# Patient Record
Sex: FEMALE | Race: BLACK OR AFRICAN AMERICAN | NOT HISPANIC OR LATINO | Employment: STUDENT | URBAN - METROPOLITAN AREA
[De-identification: names, ages, dates, MRNs, and addresses within clinical notes are randomized per-mention and may not be internally consistent; named-entity substitution may affect disease eponyms.]

---

## 2020-03-27 ENCOUNTER — PES CALL (OUTPATIENT)
Dept: ADMINISTRATIVE | Facility: CLINIC | Age: 40
End: 2020-03-27

## 2020-03-27 ENCOUNTER — TELEPHONE (OUTPATIENT)
Dept: INTERNAL MEDICINE | Facility: CLINIC | Age: 40
End: 2020-03-27

## 2020-03-27 ENCOUNTER — OFFICE VISIT (OUTPATIENT)
Dept: INTERNAL MEDICINE | Facility: CLINIC | Age: 40
End: 2020-03-27
Payer: MEDICAID

## 2020-03-27 ENCOUNTER — NURSE TRIAGE (OUTPATIENT)
Dept: ADMINISTRATIVE | Facility: CLINIC | Age: 40
End: 2020-03-27

## 2020-03-27 DIAGNOSIS — J20.8 VIRAL BRONCHITIS: Primary | ICD-10-CM

## 2020-03-27 PROCEDURE — 99213 OFFICE O/P EST LOW 20 MIN: CPT | Mod: 95,,, | Performed by: INTERNAL MEDICINE

## 2020-03-27 PROCEDURE — 99213 PR OFFICE/OUTPT VISIT, EST, LEVL III, 20-29 MIN: ICD-10-PCS | Mod: 95,,, | Performed by: INTERNAL MEDICINE

## 2020-03-27 NOTE — PROGRESS NOTES
Subjective:       Patient ID: Stanley Rodas is a 39 y.o. female.    Chief Complaint: No chief complaint on file.    HPI   The patient location is: [place], [place]  The chief complaint leading to consultation is: cough  Visit type: Virtual visit with synchronous audio and video  Total time spent with patient: 10 minutes  Each patient to whom he or she provides medical services by telemedicine is:  (1) informed of the relationship between the physician and patient and the respective role of any other health care provider with respect to management of the patient; and (2) notified that he or she may decline to receive medical services by telemedicine and may withdraw from such care at any time.    Pt who was just seen in UC today for cough is here for video visit. Pt developed a dry cough on Monday which seems to be getting worse. She then developed a few episodes of diarrhea on Wed which has resolved by now and yesterday she developed SOB without any wheezing. Temp was up to 99. In UC today she had a normal CXR and Influenza screen was neg. Pt was given Abx, Albuterol inhaler and tessalon perles which she will start.     Review of Systems   Constitutional: Negative for activity change, appetite change, chills, diaphoresis, fatigue, fever and unexpected weight change.   HENT: Negative for postnasal drip, rhinorrhea, sinus pressure, sneezing, sore throat, trouble swallowing and voice change.    Respiratory: Positive for cough and shortness of breath. Negative for wheezing.    Cardiovascular: Negative for chest pain, palpitations and leg swelling.   Gastrointestinal: Positive for diarrhea. Negative for abdominal pain, blood in stool, constipation, nausea and vomiting.   Genitourinary: Negative for dysuria.   Musculoskeletal: Negative for arthralgias and myalgias.   Skin: Negative for rash and wound.   Allergic/Immunologic: Negative for environmental allergies and food allergies.   Hematological: Negative for  adenopathy. Does not bruise/bleed easily.       Objective:      Physical Exam   Constitutional: She is oriented to person, place, and time. She appears well-developed and well-nourished. No distress.   Neurological: She is alert and oriented to person, place, and time.   Skin: She is not diaphoretic.       Assessment:       1. Viral bronchitis        Plan:    1. Pt advised to take what she was given in UC today       Pt advised to go to the ED if her cough gets worse a/w worsening SOB or high fevers. She voiced understanding. Work note given for 1 wk off.

## 2020-03-27 NOTE — TELEPHONE ENCOUNTER
Virtual visit with pt today, she will need a work note giving her another week off from work b/c of her cough. She will need to be asymptomatic x 3 days until she may return to work.

## 2020-03-27 NOTE — LETTER
March 27, 2020    Re:  Stanley Rodas  [address] [address]         [facility]  [address]  [place] [id number]  Phone: [phone]  Fax: [phone] To Whom It May Concern;    Patient was seen today for her current symptoms and needs  At least another week off from work because of her cough.   She will need to be asymptomatic x 3 days before l she may   return to work.    If you have any questions or concerns, please don't hesitate to call.    Sincerely,    Aiden Babb, DO   naun/lb

## 2020-03-27 NOTE — TELEPHONE ENCOUNTER
Reason for Disposition   [1] Fever > 100.0 F (37.8 C) AND [2] diabetes mellitus or weak immune system (e.g., HIV positive, cancer chemo, splenectomy, chronic steroids)    Additional Information   Negative: Severe difficulty breathing (e.g., struggling for each breath, speaks in single words)   Negative: Bluish (or gray) lips or face now   Negative: [1] Difficulty breathing AND [2] exposure to flames, smoke, or fumes   Negative: [1] Stridor AND [2] difficulty breathing   Negative: Sounds like a life-threatening emergency to the triager   Negative: [1] Previous asthma attacks AND [2] this feels like asthma attack   Negative: Dry (non-productive) cough (i.e., no sputum or minimal clear sputum)   Negative: Chest pain  (Exception: MILD central chest pain, present only when coughing)   Negative: Difficulty breathing   Negative: Patient sounds very sick or weak to the triager   Negative: [1] Coughed up blood AND [2] > 1 tablespoon (15 ml) (Exception: blood-tinged sputum)   Negative: [1] Fever > 100.0 F (37.8 C) AND [2] bedridden (e.g., nursing home patient, CVA, chronic illness, recovering from surgery)   Negative: [1] Fever > 101 F (38.3 C) AND [2] age > 60   Negative: Fever > 103 F (39.4 C)    Protocols used: COUGH - ACUTE [id number]  pt states seen today in . Told to call us to get tested.  had shortage on covid 19 tests. pt coughing up clear mucous , flu neg. having diarrhea x 3 days(all day long). Nausea. Last pm hard to breathe.' Chest tight'. Hx bronchitis, enlarged heart, HTN. Rectum sore due to diarrhea. Taking Gatorade and water, afeb. body aches. Coughing since Monday. used albuterol didnt seem to help. Works at [employer]. Feels hot to cold. T99 max. Treated for bronchitis at , unclear CXR results - pt doesnt feel she needs abx. Pt transferred for virtual visit today

## 2020-03-30 ENCOUNTER — PATIENT MESSAGE (OUTPATIENT)
Dept: INTERNAL MEDICINE | Facility: CLINIC | Age: 40
End: 2020-03-30

## 2020-03-31 NOTE — TELEPHONE ENCOUNTER
Spoke to pt, she is having watery diarrhea which has been getting worse since starting Abx. No fevers/chills. She was advised on the need to see a provider by her house to r/o C. Diff infection. Until then push Pedialyte. ED precautions discussed with pt.

## 2020-04-02 ENCOUNTER — PATIENT MESSAGE (OUTPATIENT)
Dept: INTERNAL MEDICINE | Facility: CLINIC | Age: 40
End: 2020-04-02

## 2020-04-03 ENCOUNTER — PATIENT MESSAGE (OUTPATIENT)
Dept: INTERNAL MEDICINE | Facility: CLINIC | Age: 40
End: 2020-04-03

## 2020-06-03 PROBLEM — G47.33 OSA (OBSTRUCTIVE SLEEP APNEA): Status: ACTIVE | Noted: 2020-06-03

## 2021-01-11 DIAGNOSIS — U07.1 COVID-19 VIRUS DETECTED: ICD-10-CM

## 2021-01-13 ENCOUNTER — NURSE TRIAGE (OUTPATIENT)
Dept: ADMINISTRATIVE | Facility: CLINIC | Age: 41
End: 2021-01-13

## 2023-05-15 PROBLEM — I10 ESSENTIAL HYPERTENSION: Status: ACTIVE | Noted: 2017-06-28

## 2023-05-15 PROBLEM — D25.9 UTERINE LEIOMYOMA: Status: ACTIVE | Noted: 2017-10-12

## 2024-02-07 DIAGNOSIS — U07.1 COVID-19 VIRUS DETECTED: ICD-10-CM

## 2024-10-19 PROBLEM — R10.13 EPIGASTRIC PAIN: Status: ACTIVE | Noted: 2024-10-19

## 2024-10-19 PROBLEM — R10.11 RUQ PAIN: Status: ACTIVE | Noted: 2024-10-19

## 2025-01-19 PROBLEM — J06.9 UPPER RESPIRATORY TRACT INFECTION: Status: ACTIVE | Noted: 2025-01-19

## 2025-06-05 PROBLEM — N39.46 MIXED INCONTINENCE: Status: ACTIVE | Noted: 2025-06-05

## 2025-09-03 ENCOUNTER — TELEPHONE (OUTPATIENT)
Dept: PHARMACY | Facility: CLINIC | Age: 45
End: 2025-09-03